# Patient Record
Sex: FEMALE
[De-identification: names, ages, dates, MRNs, and addresses within clinical notes are randomized per-mention and may not be internally consistent; named-entity substitution may affect disease eponyms.]

---

## 2021-01-18 ENCOUNTER — HOSPITAL ENCOUNTER (OUTPATIENT)
Dept: HOSPITAL 5 - LAB | Age: 61
Discharge: HOME | End: 2021-01-18
Attending: INTERNAL MEDICINE
Payer: COMMERCIAL

## 2021-01-18 ENCOUNTER — HOSPITAL ENCOUNTER (EMERGENCY)
Dept: HOSPITAL 5 - ED | Age: 61
Discharge: HOME | End: 2021-01-18
Payer: MEDICAID

## 2021-01-18 VITALS — SYSTOLIC BLOOD PRESSURE: 138 MMHG | DIASTOLIC BLOOD PRESSURE: 85 MMHG

## 2021-01-18 DIAGNOSIS — B20: Primary | ICD-10-CM
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
## 2021-01-18 DIAGNOSIS — D69.6: ICD-10-CM

## 2021-01-18 DIAGNOSIS — K75.9: ICD-10-CM

## 2021-01-18 DIAGNOSIS — Z21: ICD-10-CM
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
## 2021-01-18 DIAGNOSIS — S63.501A: Primary | ICD-10-CM

## 2021-01-18 DIAGNOSIS — Z79.899: ICD-10-CM

## 2021-01-18 LAB
ALBUMIN SERPL-MCNC: 3.9 G/DL (ref 3.9–5)
ALT SERPL-CCNC: 46 UNITS/L (ref 7–56)
APTT BLD: 31.5 SEC. (ref 24.2–36.6)
BASOPHILS # (AUTO): 0 K/MM3 (ref 0–0.1)
BASOPHILS NFR BLD AUTO: 0.1 % (ref 0–1.8)
BUN SERPL-MCNC: 11 MG/DL (ref 7–17)
BUN/CREAT SERPL: 12 %
CALCIUM SERPL-MCNC: 9.2 MG/DL (ref 8.4–10.2)
EOSINOPHIL # BLD AUTO: 0.1 K/MM3 (ref 0–0.4)
EOSINOPHIL NFR BLD AUTO: 2 % (ref 0–4.3)
HCT VFR BLD CALC: 43.8 % (ref 30.3–42.9)
HEMOLYSIS INDEX: 9
HGB BLD-MCNC: 14.8 GM/DL (ref 10.1–14.3)
INR PPP: 0.91 (ref 0.87–1.13)
LYMPHOCYTES # BLD AUTO: 0.7 K/MM3 (ref 1.2–5.4)
LYMPHOCYTES NFR BLD AUTO: 16.1 % (ref 13.4–35)
MCHC RBC AUTO-ENTMCNC: 34 % (ref 30–34)
MCV RBC AUTO: 86 FL (ref 79–97)
MONOCYTES # (AUTO): 0.4 K/MM3 (ref 0–0.8)
MONOCYTES % (AUTO): 10.1 % (ref 0–7.3)
PLATELET # BLD: 11 K/MM3 (ref 140–440)
RBC # BLD AUTO: 5.11 M/MM3 (ref 3.65–5.03)

## 2021-01-18 PROCEDURE — 93005 ELECTROCARDIOGRAM TRACING: CPT

## 2021-01-18 PROCEDURE — 82024 ASSAY OF ACTH: CPT

## 2021-01-18 PROCEDURE — 85025 COMPLETE CBC W/AUTO DIFF WBC: CPT

## 2021-01-18 PROCEDURE — 84484 ASSAY OF TROPONIN QUANT: CPT

## 2021-01-18 PROCEDURE — 80053 COMPREHEN METABOLIC PANEL: CPT

## 2021-01-18 PROCEDURE — 85730 THROMBOPLASTIN TIME PARTIAL: CPT

## 2021-01-18 PROCEDURE — 99284 EMERGENCY DEPT VISIT MOD MDM: CPT

## 2021-01-18 PROCEDURE — 73110 X-RAY EXAM OF WRIST: CPT

## 2021-01-18 PROCEDURE — 36415 COLL VENOUS BLD VENIPUNCTURE: CPT

## 2021-01-18 PROCEDURE — 85610 PROTHROMBIN TIME: CPT

## 2021-01-18 NOTE — EVENT NOTE
ED Screening Note


Date of service: 01/18/21


Time: 14:33


ED Screening Note: 





Patient presents with complaints of weakness and right wrist pain after fall


History of thrombocytopenia and multiple blood transfusion





This initial assessment/diagnostic orders/clinical plan/treatment(s) is/are 

subject to change based on patients health status, clinical progression and re-

assessment by fellow clinical providers in the ED. Further treatment and workup 

at subsequent clinical providers discretion. Patient/guardian urged not to elope

from the ED as their condition may be serious if not clinically assessed and 

managed. 





Initial orders include: 


Labs


EKG

## 2021-01-18 NOTE — XRAY REPORT
LEFT WRIST 3 VIEWS



INDICATION / CLINICAL INFORMATION:

Left wrist pain after fall.



COMPARISON:

None available.

 

FINDINGS:



BONES and JOINT(S): No acute fracture or subluxation. The bones are demineralized. There is moderate 
to severe osteoarthritis at the first CMC joint with mild radiocarpal joint space loss. Mild osteoart
hritis is noted at the interphalangeal joint of the thumb.

SOFT TISSUES: No significant abnormality.



ADDITIONAL FINDINGS: None.



IMPRESSION:

1. No acute findings.

2. Additional findings as above.



Signer Name: Swapnil Red MD 

Signed: 1/18/2021 2:53 PM

Workstation Name: [id number]

## 2021-01-18 NOTE — EMERGENCY DEPARTMENT REPORT
ED General Adult HPI





- General


Chief complaint: Weakness


Stated complaint: BAD NOSE BLEED


PUI?: No


Time Seen by Provider: 01/18/21 14:32


Source: patient


Mode of arrival: Ambulatory


Limitations: No Limitations





- History of Present Illness


Initial comments: 





Chief complaint: I am weak.  I hurt my wrist.  My platelets are low.





HPI: This is a 60-year-old female with a history of HIV, HIV associated 

thrombocytopenia who presents with intermittent nosebleeds.  Patient fell 

yesterday from standing position.  Patient has had intermittent nosebleeding for

the last 3 to 4 days.  She attributed to low platelet count.  She is followed by

Dr. Carli Zepeda, HIV doctor in [facility].  In order to address 

thrombocytopenia she is taking has taken steroids.  She has received 2 platelet 

transfusions.  She has been struggling with thrombocytopenia for about a month. 

She has not taken ART in 1 year.  She says that she is just "tired of taking the

medication..





She has right hand wrist pain after fall from standing on yesterday. 





No history of syncope.  No chest pain.  No shortness of breath.  No head trauma.





Patient has tenderness to the right hand and wrist.  Aleve did not provide any 

relief.  Pain is worse with range of motion.


-: days(s) (Several days of nosebleeds), month(s) (Thrombocytopenia for the last

month)


Location: right, upper extremity


Severity scale (0 -10): 8


Quality: aching


Consistency: constant


Improves with: none


Worsens with: none


Associated Symptoms: weakness, other (Nosebleeds)


Treatments Prior to Arrival: none





- Related Data


                                  Previous Rx's











 Medication  Instructions  Recorded  Last Taken  Type


 


HYDROcodone/APAP 5-325 [Leisenring 1 each PO Q6HR PRN #10 tablet 01/18/21 Unknown Rx





5/325]    


 


predniSONE 10 mg PO .TAPER #48 tab 01/18/21 Unknown Rx











                                    Allergies











Allergy/AdvReac Type Severity Reaction Status Date / Time


 


No Known Allergies Allergy   Unverified 01/18/21 11:30














ED Review of Systems


ROS: 


Stated complaint: BAD NOSE BLEED


Other details as noted in HPI





Comment: All other systems reviewed and negative


Constitutional: malaise.  denies: fever


Respiratory: denies: cough, shortness of breath


Gastrointestinal: denies: nausea, vomiting


Hematological/Lymphatic: easy bleeding





ED Past Medical Hx





- Past Medical History


Previous Medical History?: Yes


Hx HIV: Yes


Additional medical history: THROMBOCYTOPENIA





- Social History


Smoking Status: Never Smoker


Substance Use Type: None





- Medications


Home Medications: 


                                Home Medications











 Medication  Instructions  Recorded  Confirmed  Last Taken  Type


 


HYDROcodone/APAP 5-325 [Leisenring 1 each PO Q6HR PRN #10 tablet 01/18/21  Unknown Rx





5/325]     


 


predniSONE 10 mg PO .TAPER #48 tab 01/18/21  Unknown Rx














ED Physical Exam





- General


Limitations: No Limitations


General appearance: alert, in no apparent distress





- Head


Head exam: Present: atraumatic, normocephalic





- Eye


Eye exam: Present: normal appearance





- ENT


ENT exam: Present: mucous membranes moist





- Neck


Neck exam: Present: normal inspection, full ROM





- Respiratory


Respiratory exam: Present: normal lung sounds bilaterally.  Absent: respiratory 

distress, wheezes, rales, rhonchi





- Cardiovascular


Cardiovascular Exam: Present: regular rate, normal rhythm, normal heart sounds. 

Absent: systolic murmur, diastolic murmur, rubs, gallop





- GI/Abdominal


GI/Abdominal exam: Present: soft, normal bowel sounds.  Absent: distended, 

tenderness, guarding, rebound





- Extremities Exam


Extremities exam: Present: normal inspection





- Expanded Upper Extremity Exam


  ** Right


Shoulder Exam: Present: normal inspection, full ROM


Upper Arm exam: Present: normal inspection, full ROM


Elbow exam: Present: normal inspection, full ROM


Forearm Wrist exam: Present: full ROM, tenderness.  Absent: swelling, abrasion


Hand Wrist exam: Present: normal inspection, full ROM





- Back Exam


Back exam: Present: normal inspection





- Neurological Exam


Neurological exam: Present: alert, oriented X3





- Psychiatric


Psychiatric exam: Present: normal affect, normal mood





- Skin


Skin exam: Present: warm, dry, intact, normal color.  Absent: rash





ED Course


                                   Vital Signs











  01/18/21





  14:23


 


Temperature 98.1 F


 


Pulse Rate 114 H


 


Respiratory 20





Rate 


 


Blood Pressure 138/85


 


O2 Sat by Pulse 98





Oximetry 














ED Medical Decision Making





- Lab Data


Result diagrams: 


                                 01/18/21 15:06





                                 01/18/21 15:06








                         Laboratory Results - last 24 hr











  01/18/21 01/18/21 01/18/21





  15:06 15:06 15:06


 


WBC  4.1 L  


 


RBC  5.11 H  


 


Hgb  14.8 H  


 


Hct  43.8 H  


 


MCV  86  


 


MCH  29  


 


MCHC  34  


 


RDW  13.6  


 


Plt Count  11 L*  


 


Lymph % (Auto)  16.1  


 


Mono % (Auto)  10.1 H  


 


Eos % (Auto)  2.0  


 


Baso % (Auto)  0.1  


 


Lymph # (Auto)  0.7 L  


 


Mono # (Auto)  0.4  


 


Eos # (Auto)  0.1  


 


Baso # (Auto)  0.0  


 


Seg Neutrophils %  71.7 H  


 


Seg Neutrophils #  3.0  


 


PT    12.1 L


 


INR    0.91


 


APTT    31.5


 


Sodium   141 


 


Potassium   4.6 


 


Chloride   107.7 H 


 


Carbon Dioxide   25 


 


Anion Gap   13 


 


BUN   11 


 


Creatinine   0.9 


 


Estimated GFR   > 60 


 


BUN/Creatinine Ratio   12 


 


Glucose   143 H 


 


Calcium   9.2 


 


Total Bilirubin   0.70 


 


AST   60 H 


 


ALT   46 


 


Alkaline Phosphatase   99 


 


Troponin T   < 0.010 


 


Total Protein   7.5 


 


Albumin   3.9 


 


Albumin/Globulin Ratio   1.1 














- EKG Data


-: EKG Interpreted by Me


EKG shows normal: sinus rhythm, axis, intervals, QRS complexes, ST-T waves


Rate: normal





- EKG Data


Interpretation: normal EKG





01/18/21 16:16


EKG obtained 1432


EKG interpreted by me


Normal sinus rhythm normal rate normal axis normal intervals no ST elevation no 

ST-T signs of ischemia normal EKG rate 80 bpm





- Radiology Data


Radiology results: report reviewed





LEFT WRIST 3 VIEWS


INDICATION / CLINICAL INFORMATION:


Left wrist pain after fall.


COMPARISON:


None available.


FINDINGS:


BONES and JOINT(S): No acute fracture or subluxation. The bones are 

demineralized. There is moderate to severe osteoarthritis at


the first CMC joint with mild radiocarpal joint space loss. Mild osteoarthritis 

is noted at the interphalangeal joint of the thumb.


SOFT TISSUES: No significant abnormality.


ADDITIONAL FINDINGS: None.


IMPRESSION:


1. No acute findings.


2. Additional findings as above. 





- Medical Decision Making





1.  Generalized weakness: No evidence of bleeding.  Mild dehydration suspected 

with hemoconcentration.





2.  HIV-associated thrombocytopenia: Prednisone taper prescribed.  Patient 

strongly encouraged to resume taking ART





3.  Right wrist pain after fall: Left wrist radiographs were ordered.  However 

patient did have imaging performed of the right wrist.  However the imaging has 

been read as the left wrist.  I have consulted radiology supervisor in order to 

rectify the documentation.  No evidence of fracture or carpal bone dislocation 

on clinical exam.  Patient prescribed Norco.





Patient was given referrals to both outpatient medicine physician and infectious

disease consultant.


Critical care attestation.: 


If time is entered above; I have spent that time in minutes in the direct care 

of this critically ill patient, excluding procedure time.








ED Disposition


Clinical Impression: 


 HIV (human immunodeficiency virus infection), Thrombocytopenia, Right wrist 

sprain, Generalized weakness





Disposition: DC-01 TO HOME OR SELFCARE


Is pt being admited?: No


Does the pt Need Aspirin: No


Condition: Stable


Prescriptions: 


HYDROcodone/APAP 5-325 [Leisenring 5/325] 1 each PO Q6HR PRN #10 tablet


 PRN Reason: Pain


predniSONE 10 mg PO .TAPER #48 tab


Referrals: 


ZORAN CAIN MD [Staff Physician] - 3-5 Days


MAHOGANY LEBRON MD [Staff Physician] - 3-5 Days

## 2021-01-18 NOTE — XRAY REPORT
LEFT WRIST 3 VIEWS



INDICATION / CLINICAL INFORMATION:

Left wrist pain after fall.



COMPARISON:

None available.

 

FINDINGS:



BONES and JOINT(S): No acute fracture or subluxation. The bones are demineralized. There is moderate 
to severe osteoarthritis at the first CMC joint with mild radiocarpal joint space loss. Mild osteoart
hritis is noted at the interphalangeal joint of the thumb.

SOFT TISSUES: No significant abnormality.



ADDITIONAL FINDINGS: None.



IMPRESSION:

1. No acute findings.

2. Additional findings as above.



Signer Name: Swapnil Red MD 

Signed: 1/18/2021 2:53 PM

Workstation Name: [id number]

## 2021-01-21 LAB
CD19 CELLS # BLD: 34 CELLS/UL (ref 110–660)
CD3, ABSOLUTE: 427 CELLS/UL (ref 840–3060)
CD4, ABSOLUTE: 140 CELLS/UL (ref 490–1740)
CD4/CD8 RATIO: 0.47 (ref 0.86–5)
CD8, ABSOLUTE: 295 CELLS/UL (ref 180–1170)
LYMPHOCYTES # BLD AUTO: 563 CELLS/UL (ref 850–3900)